# Patient Record
Sex: FEMALE | Race: BLACK OR AFRICAN AMERICAN | NOT HISPANIC OR LATINO | Employment: STUDENT | URBAN - METROPOLITAN AREA
[De-identification: names, ages, dates, MRNs, and addresses within clinical notes are randomized per-mention and may not be internally consistent; named-entity substitution may affect disease eponyms.]

---

## 2017-12-13 ENCOUNTER — GENERIC CONVERSION - ENCOUNTER (OUTPATIENT)
Dept: OTHER | Facility: OTHER | Age: 22
End: 2017-12-13

## 2017-12-20 ENCOUNTER — LAB CONVERSION - ENCOUNTER (OUTPATIENT)
Dept: OBGYN CLINIC | Facility: CLINIC | Age: 22
End: 2017-12-20

## 2017-12-20 LAB
ADEQUACY: (HISTORICAL): ABNORMAL
CLINICIAN PROVIDIED ICD 9 OR 10 (HISTORICAL): ABNORMAL
COMMENT (HISTORICAL): ABNORMAL
DIAGNOSIS (HISTORICAL): ABNORMAL
ELECTRONICALLY SIGNED BY (HISTORICAL): ABNORMAL
HPV HIGH RISK (HISTORICAL): POSITIVE
PATHOLOGIST PROVIDED ICD10 (HISTORICAL): ABNORMAL
PERFORMED BY (HISTORICAL): ABNORMAL
RECOMMENDATION (HISTORICAL): ABNORMAL
REFLEX (HISTORICAL): ABNORMAL

## 2017-12-26 ENCOUNTER — GENERIC CONVERSION - ENCOUNTER (OUTPATIENT)
Dept: OTHER | Facility: OTHER | Age: 22
End: 2017-12-26

## 2017-12-28 ENCOUNTER — GENERIC CONVERSION - ENCOUNTER (OUTPATIENT)
Dept: OTHER | Facility: OTHER | Age: 22
End: 2017-12-28

## 2018-01-03 LAB
. (HISTORICAL): NORMAL

## 2018-01-08 ENCOUNTER — GENERIC CONVERSION - ENCOUNTER (OUTPATIENT)
Dept: OTHER | Facility: OTHER | Age: 23
End: 2018-01-08

## 2018-01-23 NOTE — RESULT NOTES
Verified Results  [facility]) Pathology Report [id number] 12:00AM Corey Isbell     Test Name Result Flag Reference     Material submitted: Arabi Organ PART A: CERVICAL @ 4 O'CLOCK  PART B: CERVICAL @ 11 O'CLOCK  PART C: ENDOCERVICAL   Material submitted: Arabi Organ PART A: CERVICAL @ 4 O'CLOCK  PART B: CERVICAL @ 11 O'CLOCK  PART C: ENDOCERVICAL     (Report)     **********************************************************************  Diagnosis:  Part A: CERVICAL @ 4 O'CLOCK:  ACUTE AND CHRONIC CERVICITIS WITH SQUAMOUS METAPLASIA  Part B: CERVICAL @ 11 O'CLOCK:  FOCAL LOW GRADE SQUAMOUS INTRAEPITHELIAL LESION (JACKIE 1)  ACUTE  AND CHRONIC CERVICITIS  Part C: ENDOCERVICAL:  MINUTE FRAGMENTS OF BENIGN ENDOCERVICAL GLANDS, MUCUS, AND BLOOD   EDX/01/03/2018  **********************************************************************   **********************************************************************  Diagnosis:  Part A: CERVICAL @ 4 O'CLOCK:  ACUTE AND CHRONIC CERVICITIS WITH SQUAMOUS METAPLASIA  Part B: CERVICAL @ 11 O'CLOCK:  FOCAL LOW GRADE SQUAMOUS INTRAEPITHELIAL LESION (JACKIE 1)  ACUTE  AND CHRONIC CERVICITIS  Part C: ENDOCERVICAL:  MINUTE FRAGMENTS OF BENIGN ENDOCERVICAL GLANDS, MUCUS, AND BLOOD   EDX/01/03/2018  **********************************************************************     Electronically signed: Eliud Riddle MD, Pathologist   Electronically signed: Eliud Riddle MD, Pathologist     (Report)     Gross description:                       Part A: CERVICAL @ 4 O'CLOCK:  Received are 2 fragments of tan soft tissue measuring from 0 3 to 0 4  cm in greatest dimensions, entirely submitted  Part B: CERVICAL @ 11 O'CLOCK:  Received is a single fragment of tan, soft tissue measuring 0 4 cm in  greatest dimensions, entirely submitted in 1 cassette    Part C: ENDOCERVICAL:  Received in formalin are minute amounts of particulate matter  measuring   01 x  01 x  01 cm  The material is inadequate in  volume for routine processing  It will be submitted in toto as a  cell block into 1 cassette  May not survive  Rita Lubna  FGA/FGA   Gross description:                                           Part A: CERVICAL @ 4 O'CLOCK:  Received are 2 fragments of tan soft tissue measuring from 0 3 to 0 4  cm in greatest dimensions, entirely submitted  Part B: CERVICAL @ 11 O'CLOCK:  Received is a single fragment of tan, soft tissue measuring 0 4 cm in  greatest dimensions, entirely submitted in 1 cassette  Part C: ENDOCERVICAL:  Received in formalin are minute amounts of particulate matter  measuring   01 x  01 x  01 cm  The material is inadequate in  volume for routine processing  It will be submitted in toto as a  cell block into 1 cassette  May not survive  /FGA  FGA/FGA     Pathologist provided ICD-10:  Gunnar Velasquez, N87 0   Pathologist provided ICD-10:  Gunnar Velasquez, N87 0     CPT                              [id number], [id number], [id number]   CPT                                                            [id number], [id number], [id number]

## 2018-01-23 NOTE — RESULT NOTES
Verified Results  ([facility]) Pap IG, rfx HPV ASCU [id number] 12:00AM Mateusz Borges     Test Name Result Flag Reference   DIAGNOSIS:  A    EPITHELIAL CELL ABNORMALITY  ATYPICAL SQUAMOUS CELLS OF UNDETERMINED SIGNIFICANCE  EPITHELIAL CELL ABNORMALITY  ATYPICAL SQUAMOUS CELLS OF UNDETERMINED SIGNIFICANCE  Recommendation:  A    Suggest follow up as clinically appropriate  Suggest follow up as clinically appropriate  Specimen adequacy:      Satisfactory for evaluation  Endocervical and/or squamous metaplastic  cells (endocervical component) are present  Satisfactory for evaluation  Endocervical and/or squamous metaplastic  cells (endocervical component) are present  Clinician provided ICD10: O24 039     Z01 [id number]   Performed by:      Annalisa Nicole Cytotechnologist (ASCP)   Annalisa Nicole Cytotechnologist (ASCP)   Electronically signed by:      Marcela Parikh DO, Pathologist   Paris Martinez DO, Pathologist   Debra   Pathologist provided ICD10: R87 610     R87 610   Note: (Report)     The Pap smear is a screening test designed to aid in the detection of  premalignant and malignant conditions of the uterine cervix  It is not a  diagnostic procedure and should not be used as the sole means of detecting  cervical cancer  Both false-positive and false-negative reports do occur  The Pap smear is a screening test designed to aid in the detection of  premalignant and malignant conditions of the uterine cervix  It is not a  diagnostic procedure and should not be used as the sole means of detecting  cervical cancer  Both false-positive and false-negative reports do occur  Reflex      See below for HPV testing results  See below for HPV testing results  HPV, high-risk Positive A Negative   This high-risk HPV test detects thirteen high-risk types  (16/18/31/33/35/39/45/51/52/56/58/59/68) without differentiation

## 2018-01-24 VITALS
DIASTOLIC BLOOD PRESSURE: 70 MMHG | HEIGHT: 68 IN | WEIGHT: 176.5 LBS | BODY MASS INDEX: 26.75 KG/M2 | SYSTOLIC BLOOD PRESSURE: 132 MMHG

## 2018-01-24 VITALS
BODY MASS INDEX: 25.93 KG/M2 | DIASTOLIC BLOOD PRESSURE: 84 MMHG | WEIGHT: 171.13 LBS | HEIGHT: 68 IN | SYSTOLIC BLOOD PRESSURE: 126 MMHG

## 2019-01-08 ENCOUNTER — ANNUAL EXAM (OUTPATIENT)
Dept: OBGYN CLINIC | Facility: MEDICAL CENTER | Age: 24
End: 2019-01-08
Payer: COMMERCIAL

## 2019-01-08 VITALS
WEIGHT: 174.7 LBS | DIASTOLIC BLOOD PRESSURE: 78 MMHG | SYSTOLIC BLOOD PRESSURE: 112 MMHG | BODY MASS INDEX: 25.87 KG/M2 | HEIGHT: 69 IN

## 2019-01-08 DIAGNOSIS — Z30.41 ENCOUNTER FOR SURVEILLANCE OF CONTRACEPTIVE PILLS: ICD-10-CM

## 2019-01-08 DIAGNOSIS — Z23 NEED FOR HPV VACCINATION: ICD-10-CM

## 2019-01-08 DIAGNOSIS — Z01.419 ENCOUNTER FOR ROUTINE GYNECOLOGICAL EXAMINATION WITH PAPANICOLAOU SMEAR OF CERVIX: Primary | ICD-10-CM

## 2019-01-08 PROBLEM — N87.0 CIN I (CERVICAL INTRAEPITHELIAL NEOPLASIA I): Status: ACTIVE | Noted: 2019-01-08

## 2019-01-08 PROCEDURE — 90651 9VHPV VACCINE 2/3 DOSE IM: CPT | Performed by: OBSTETRICS & GYNECOLOGY

## 2019-01-08 PROCEDURE — 99395 PREV VISIT EST AGE 18-39: CPT | Performed by: OBSTETRICS & GYNECOLOGY

## 2019-01-08 PROCEDURE — 90471 IMMUNIZATION ADMIN: CPT | Performed by: OBSTETRICS & GYNECOLOGY

## 2019-01-08 RX ORDER — NORETHINDRONE ACETATE AND ETHINYL ESTRADIOL 1.5-30(21)
1 KIT ORAL DAILY
Qty: 28 TABLET | Refills: 0 | Status: SHIPPED | OUTPATIENT
Start: 2019-01-08 | End: 2019-02-03 | Stop reason: SDUPTHER

## 2019-01-08 RX ORDER — NORETHINDRONE ACETATE AND ETHINYL ESTRADIOL 1.5-30(21)
KIT ORAL
COMMUNITY
End: 2019-01-08 | Stop reason: SDUPTHER

## 2019-01-08 NOTE — PROGRESS NOTES
ASSESSMENT & PLAN: Diagnoses and all orders for this visit:    Encounter for routine gynecological examination with Papanicolaou smear of cervix  -     Thinprep Tis Pap Reflex HPV mRNA E6/E7    Encounter for surveillance of contraceptive pills  -     norethindrone-ethinyl estradiol-iron ([place]  FE 1 5/30) 1 5-30 MG-MCG tablet; Take 1 tablet by mouth daily    Other orders  -     Discontinue: norethindrone-ethinyl estradiol-iron (JUNEL FE 1 5/30) 1 5-30 MG-MCG tablet; Take by mouth         1  Routine well woman exam done today  2  Pap:  The patient's pap is not up to date  Pap was done today  Current ASCCP Guidelines reviewed  3   STD testing  was not done  Patient declined testing  4   Patient has not had her Gardasil vaccination  Recommendations reviewed  Patient desires to have Gardasil done today  5  The following were reviewed in today's visit: adequate intake of calcium and vitamin D, exercise and healthy diet  6  F/u in 1 year  7    Also discussed with patient management of abnormal Paps  If Pap continues to be abnormal, have discussed proceeding with cryo therapy procedure  Patient given pamphlet  CC:  Annual Gynecologic Examination    HPI: Nelida Kumar is a 21 y o  who presents for annual gynecologic examination  She has the following concerns:  Pt with questions regarding abnormal pap and gardasil vaccination  The patient reports she has already had 2 years of JACKIE 1 and wants to know what the management options are  Patient also reports she thinks she has had only 1 dose of the 3 dose HPV series and would like to restart the vaccination  Health Maintenance:    Patient describes her health as good  The last health maintenance visit was 1 years ago  Patient does not have weight concerns  She exercises 2 days per week with gym  She does wear her seatbelt routinely  She does not perform regular monthly self breast exams  She does feels safe at home     Patients does not follow a special diet  Patients gets 1 servings of dairy or calcium rich foods a day  Last pap: 1 year ago     Patient Active Problem List   Diagnosis    JACKIE I (cervical intraepithelial neoplasia I)       Past Medical History:   Diagnosis Date    Abnormal Pap smear of cervix        History reviewed  No pertinent surgical history  Past OB/Gyn History:  Pt does not have menstrual issues  On bcp's  History of sexually transmitted infection: Yes  +HPV  History of abnormal pap smears: Yes CIN1  Patient is currently sexually active  heterosexual and  monogamous  4 years  The current method of family planning is OCP (estrogen/progesterone)  Family History   Problem Relation Age of Onset    Diabetes Father     Diabetes Maternal Uncle     Diabetes Family        Social History:  Social History     Social History    Marital status: Single     Spouse name: N/A    Number of children: N/A    Years of education: N/A     Occupational History    Not on file  Social History Main Topics    Smoking status: Never Smoker    Smokeless tobacco: Never Used    Alcohol use Yes      Comment: Social    Drug use: No    Sexual activity: Yes     Partners: Male     Birth control/ protection: OCP     Other Topics Concern    Not on file     Social History Narrative    No narrative on file     Presently lives with roommates  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, PT job in [employer]    No Known Allergies      Current Outpatient Prescriptions:     norethindrone-ethinyl estradiol-iron (Alysia Burnett FE 1 5/30) 1 5-30 MG-MCG tablet, Take 1 tablet by mouth daily, Disp: 28 tablet, Rfl: 0      Review of Systems  Constitutional :no fever, feels well, no tiredness, no recent weight gain or loss  ENT: no ear ache, no loss of hearing, no nosebleeds or nasal discharge, no sore throat or hoarseness    Cardiovascular: no complaints of slow or fast heart beat, no chest pain, no palpitations, no leg claudication or lower extremity edema  Respiratory: no complaints of shortness of shortness of breath, no ALY  Breasts:no complaints of breast pain, breast lump, or nipple discharge  Gastrointestinal: no complaints of abdominal pain, constipation, nausea, vomiting, or diarrhea or bloody stools  Genitourinary : no complaints of dysuria, incontinence, pelvic pain, no dysmenorrhea, vaginal discharge or abnormal vaginal bleeding and as noted in HPI  Musculoskeletal: no complaints of arthralgia, no myalgia, no joint swelling or stiffness, no limb pain or swelling  Integumentary: no complaints of skin rash or lesion, itching or dry skin  Neurological: no complaints of headache, no confusion, no numbness or tingling, no dizziness or fainting      Physical Exam:   /78   Ht 5' 8 5" (1 74 m)   Wt 79 2 kg (174 lb 11 2 oz)   LMP 12/08/2018 (Within Days)   BMI 26 18 kg/m²     General: appears stated age, cooperative, alert normal mood and affect   Psychiatric oriented to person, place and time  Mood and affect normal   Neck: normal, supple,trachea midline, no masses  Thyroid: normal, no thyromegaly   Heart: regular rate and rhythm, S1, S2 normal, no murmur, click, rub or gallop   Lungs: clear to auscultation bilaterally, no increased work of breathing or signs of respiratory distress   Breasts: normal, no dimpling or skin changes noted   Abdomen: soft, non-tender, without masses or organomegaly   Vulva: normal , no lesions   Vagina: normal , no lesions or dryness   Urethra: normal   Urethal meatus normal   Bladder Normal, soft, non-tender and no prolapse or masses appreciated   Cervix: normal, no palpable masses    Uterus: normal , non-tender, not enlarged, no palpable masses   Adnexa: normal, non-tender without fullness or masses   Lymphatic Palpation of lymph nodes in neck, axilla, groin and/or other locations: no lymphadenopathy or masses noted   Skin Normal skin turgor and no rashes  Palpation of skin and subcutaneous tissue normal

## 2019-01-14 LAB
CLINICAL INFO: ABNORMAL
CYTO CVX: ABNORMAL
DATE PREVIOUS BX: ABNORMAL
GEN CATEG CVX/VAG CYTO-IMP: ABNORMAL
HPV E6+E7 MRNA CVX QL NAA+PROBE: NOT DETECTED
LMP START DATE: ABNORMAL
MICROORGANISM CVX/VAG CYTO: ABNORMAL
SL AMB PREV. PAP:: ABNORMAL
SPECIMEN SOURCE CVX/VAG CYTO: ABNORMAL

## 2019-01-15 NOTE — PROGRESS NOTES
Please notify pt of ASCUS pap with neg HPV  Would need repeat pap in 3 years  Also has yeast on pap    Please offer diflucan 150 mg po x 1 or OTC antifungal

## 2019-02-03 DIAGNOSIS — Z30.41 ENCOUNTER FOR SURVEILLANCE OF CONTRACEPTIVE PILLS: ICD-10-CM

## 2019-02-04 RX ORDER — NORETHINDRONE ACETATE AND ETHINYL ESTRADIOL AND FERROUS FUMARATE 1.5-30(21)
KIT ORAL
Qty: 28 TABLET | Refills: 0 | Status: SHIPPED | OUTPATIENT
Start: 2019-02-04 | End: 2019-02-28 | Stop reason: SDUPTHER

## 2019-02-28 DIAGNOSIS — Z30.41 ENCOUNTER FOR SURVEILLANCE OF CONTRACEPTIVE PILLS: ICD-10-CM

## 2019-02-28 RX ORDER — NORETHINDRONE ACETATE AND ETHINYL ESTRADIOL 1.5-30(21)
1 KIT ORAL DAILY
Qty: 90 TABLET | Refills: 3 | Status: SHIPPED | OUTPATIENT
Start: 2019-02-28 | End: 2020-02-04 | Stop reason: SDUPTHER

## 2019-03-13 ENCOUNTER — CLINICAL SUPPORT (OUTPATIENT)
Dept: OBGYN CLINIC | Facility: MEDICAL CENTER | Age: 24
End: 2019-03-13
Payer: COMMERCIAL

## 2019-03-13 DIAGNOSIS — Z23 NEED FOR HPV VACCINATION: Primary | ICD-10-CM

## 2019-03-13 PROCEDURE — 90651 9VHPV VACCINE 2/3 DOSE IM: CPT | Performed by: OBSTETRICS & GYNECOLOGY

## 2019-03-13 PROCEDURE — 90471 IMMUNIZATION ADMIN: CPT | Performed by: OBSTETRICS & GYNECOLOGY

## 2019-03-13 NOTE — PROGRESS NOTES
Here for gardasil #2  Given in left arm    Waiver signed  NDC# 2562-7459-83  Lot# [id number]  Exp 06/03/2021

## 2019-07-08 ENCOUNTER — CLINICAL SUPPORT (OUTPATIENT)
Dept: OBGYN CLINIC | Facility: MEDICAL CENTER | Age: 24
End: 2019-07-08
Payer: COMMERCIAL

## 2019-07-08 DIAGNOSIS — Z23 NEED FOR HPV VACCINATION: Primary | ICD-10-CM

## 2019-07-08 PROCEDURE — 90471 IMMUNIZATION ADMIN: CPT | Performed by: OBSTETRICS & GYNECOLOGY

## 2019-07-08 PROCEDURE — 90651 9VHPV VACCINE 2/3 DOSE IM: CPT | Performed by: OBSTETRICS & GYNECOLOGY

## 2019-07-08 NOTE — PROGRESS NOTES
Here for gardasil #3  Given Im in left deltoid per patient request  NDC# 5348-4634-10  Lot# [id number]  Exp 7/10/21

## 2020-02-04 DIAGNOSIS — Z30.41 ENCOUNTER FOR SURVEILLANCE OF CONTRACEPTIVE PILLS: ICD-10-CM

## 2020-02-04 RX ORDER — NORETHINDRONE ACETATE AND ETHINYL ESTRADIOL AND FERROUS FUMARATE 1.5-30(21)
KIT ORAL
Qty: 84 TABLET | Refills: 0 | Status: SHIPPED | OUTPATIENT
Start: 2020-02-04